# Patient Record
Sex: FEMALE | Race: WHITE | Employment: OTHER | URBAN - METROPOLITAN AREA
[De-identification: names, ages, dates, MRNs, and addresses within clinical notes are randomized per-mention and may not be internally consistent; named-entity substitution may affect disease eponyms.]

---

## 2022-05-04 ENCOUNTER — APPOINTMENT (OUTPATIENT)
Dept: GENERAL RADIOLOGY | Facility: HOSPITAL | Age: 87
End: 2022-05-04
Attending: EMERGENCY MEDICINE
Payer: MEDICARE

## 2022-05-04 ENCOUNTER — HOSPITAL ENCOUNTER (INPATIENT)
Facility: HOSPITAL | Age: 87
LOS: 3 days | Discharge: HOME | End: 2022-05-07
Attending: EMERGENCY MEDICINE | Admitting: INTERNAL MEDICINE
Payer: MEDICARE

## 2022-05-04 ENCOUNTER — APPOINTMENT (OUTPATIENT)
Dept: CT IMAGING | Facility: HOSPITAL | Age: 87
End: 2022-05-04
Attending: EMERGENCY MEDICINE
Payer: MEDICARE

## 2022-05-04 DIAGNOSIS — E87.2 LACTIC ACIDOSIS: ICD-10-CM

## 2022-05-04 DIAGNOSIS — K92.0 HEMATEMESIS WITH NAUSEA: Primary | ICD-10-CM

## 2022-05-04 DIAGNOSIS — N17.9 AKI (ACUTE KIDNEY INJURY) (HCC): ICD-10-CM

## 2022-05-04 DIAGNOSIS — D62 ACUTE BLOOD LOSS ANEMIA: ICD-10-CM

## 2022-05-04 DIAGNOSIS — K57.90 DIVERTICULOSIS: ICD-10-CM

## 2022-05-04 DIAGNOSIS — K63.9 LESION OF COLON: ICD-10-CM

## 2022-05-04 DIAGNOSIS — D72.829 LEUKOCYTOSIS, UNSPECIFIED TYPE: ICD-10-CM

## 2022-05-04 DIAGNOSIS — K63.89 COLONIC MASS: ICD-10-CM

## 2022-05-04 DIAGNOSIS — R00.0 SINUS TACHYCARDIA: ICD-10-CM

## 2022-05-04 LAB
ALBUMIN SERPL-MCNC: 3.5 G/DL (ref 3.4–5)
ALBUMIN/GLOB SERPL: 0.9 {RATIO} (ref 1–2)
ALP LIVER SERPL-CCNC: 50 U/L
ALT SERPL-CCNC: 17 U/L
ANION GAP SERPL CALC-SCNC: 9 MMOL/L (ref 0–18)
ANTIBODY SCREEN: NEGATIVE
AST SERPL-CCNC: 12 U/L (ref 15–37)
ATRIAL RATE: 113 BPM
BASOPHILS # BLD AUTO: 0.06 X10(3) UL (ref 0–0.2)
BASOPHILS NFR BLD AUTO: 0.4 %
BILIRUB SERPL-MCNC: 0.2 MG/DL (ref 0.1–2)
BILIRUB UR QL STRIP.AUTO: NEGATIVE
BUN BLD-MCNC: 50 MG/DL (ref 7–18)
CALCIUM BLD-MCNC: 8.9 MG/DL (ref 8.5–10.1)
CHLORIDE SERPL-SCNC: 105 MMOL/L (ref 98–112)
CLARITY UR REFRACT.AUTO: CLEAR
CO2 SERPL-SCNC: 23 MMOL/L (ref 21–32)
CREAT BLD-MCNC: 1.29 MG/DL
EOSINOPHIL # BLD AUTO: 0.01 X10(3) UL (ref 0–0.7)
EOSINOPHIL NFR BLD AUTO: 0.1 %
ERYTHROCYTE [DISTWIDTH] IN BLOOD BY AUTOMATED COUNT: 15.9 %
GLOBULIN PLAS-MCNC: 3.8 G/DL (ref 2.8–4.4)
GLUCOSE BLD-MCNC: 179 MG/DL (ref 70–99)
GLUCOSE BLD-MCNC: 191 MG/DL (ref 70–99)
GLUCOSE UR STRIP.AUTO-MCNC: NEGATIVE MG/DL
HCT VFR BLD AUTO: 24.4 %
HGB BLD-MCNC: 7.4 G/DL
IMM GRANULOCYTES # BLD AUTO: 0.04 X10(3) UL (ref 0–1)
IMM GRANULOCYTES NFR BLD: 0.3 %
INR BLD: 0.99 (ref 0.8–1.2)
KETONES UR STRIP.AUTO-MCNC: 20 MG/DL
LACTATE SERPL-SCNC: 3.9 MMOL/L (ref 0.4–2)
LACTATE SERPL-SCNC: 5 MMOL/L (ref 0.4–2)
LEUKOCYTE ESTERASE UR QL STRIP.AUTO: NEGATIVE
LYMPHOCYTES # BLD AUTO: 1.64 X10(3) UL (ref 1–4)
LYMPHOCYTES NFR BLD AUTO: 11.5 %
MCH RBC QN AUTO: 25.1 PG (ref 26–34)
MCHC RBC AUTO-ENTMCNC: 30.3 G/DL (ref 31–37)
MCV RBC AUTO: 82.7 FL
MONOCYTES # BLD AUTO: 0.23 X10(3) UL (ref 0.1–1)
MONOCYTES NFR BLD AUTO: 1.6 %
NEUTROPHILS # BLD AUTO: 12.22 X10 (3) UL (ref 1.5–7.7)
NEUTROPHILS # BLD AUTO: 12.22 X10(3) UL (ref 1.5–7.7)
NEUTROPHILS NFR BLD AUTO: 86.1 %
NITRITE UR QL STRIP.AUTO: NEGATIVE
NT-PROBNP SERPL-MCNC: 134 PG/ML (ref ?–450)
OSMOLALITY SERPL CALC.SUM OF ELEC: 302 MOSM/KG (ref 275–295)
P AXIS: 11 DEGREES
P-R INTERVAL: 138 MS
PH UR STRIP.AUTO: 5 [PH] (ref 5–8)
PLATELET # BLD AUTO: 334 10(3)UL (ref 150–450)
POTASSIUM SERPL-SCNC: 4.9 MMOL/L (ref 3.5–5.1)
PROCALCITONIN SERPL-MCNC: <0.05 NG/ML (ref ?–0.16)
PROT SERPL-MCNC: 7.3 G/DL (ref 6.4–8.2)
PROT UR STRIP.AUTO-MCNC: NEGATIVE MG/DL
PROTHROMBIN TIME: 13.1 SECONDS (ref 11.6–14.8)
Q-T INTERVAL: 350 MS
QRS DURATION: 110 MS
QTC CALCULATION (BEZET): 480 MS
R AXIS: -68 DEGREES
RBC # BLD AUTO: 2.95 X10(6)UL
RBC UR QL AUTO: NEGATIVE
RH BLOOD TYPE: POSITIVE
SARS-COV-2 RNA RESP QL NAA+PROBE: NOT DETECTED
SODIUM SERPL-SCNC: 137 MMOL/L (ref 136–145)
SP GR UR STRIP.AUTO: 1.02 (ref 1–1.03)
T AXIS: 40 DEGREES
TROPONIN I HIGH SENSITIVITY: 13 NG/L
TSI SER-ACNC: 1.28 MIU/ML (ref 0.36–3.74)
UROBILINOGEN UR STRIP.AUTO-MCNC: <2 MG/DL
VENTRICULAR RATE: 113 BPM
WBC # BLD AUTO: 14.2 X10(3) UL (ref 4–11)

## 2022-05-04 PROCEDURE — 85025 COMPLETE CBC W/AUTO DIFF WBC: CPT | Performed by: EMERGENCY MEDICINE

## 2022-05-04 PROCEDURE — 96361 HYDRATE IV INFUSION ADD-ON: CPT

## 2022-05-04 PROCEDURE — 85610 PROTHROMBIN TIME: CPT | Performed by: EMERGENCY MEDICINE

## 2022-05-04 PROCEDURE — 82962 GLUCOSE BLOOD TEST: CPT

## 2022-05-04 PROCEDURE — 84484 ASSAY OF TROPONIN QUANT: CPT | Performed by: EMERGENCY MEDICINE

## 2022-05-04 PROCEDURE — 86850 RBC ANTIBODY SCREEN: CPT | Performed by: EMERGENCY MEDICINE

## 2022-05-04 PROCEDURE — 86920 COMPATIBILITY TEST SPIN: CPT

## 2022-05-04 PROCEDURE — 36415 COLL VENOUS BLD VENIPUNCTURE: CPT

## 2022-05-04 PROCEDURE — 80053 COMPREHEN METABOLIC PANEL: CPT | Performed by: EMERGENCY MEDICINE

## 2022-05-04 PROCEDURE — 99285 EMERGENCY DEPT VISIT HI MDM: CPT

## 2022-05-04 PROCEDURE — 36430 TRANSFUSION BLD/BLD COMPNT: CPT

## 2022-05-04 PROCEDURE — 86900 BLOOD TYPING SEROLOGIC ABO: CPT | Performed by: EMERGENCY MEDICINE

## 2022-05-04 PROCEDURE — 86901 BLOOD TYPING SEROLOGIC RH(D): CPT | Performed by: EMERGENCY MEDICINE

## 2022-05-04 PROCEDURE — 96375 TX/PRO/DX INJ NEW DRUG ADDON: CPT

## 2022-05-04 PROCEDURE — 87040 BLOOD CULTURE FOR BACTERIA: CPT | Performed by: EMERGENCY MEDICINE

## 2022-05-04 PROCEDURE — 83605 ASSAY OF LACTIC ACID: CPT | Performed by: EMERGENCY MEDICINE

## 2022-05-04 PROCEDURE — 71045 X-RAY EXAM CHEST 1 VIEW: CPT | Performed by: EMERGENCY MEDICINE

## 2022-05-04 PROCEDURE — 84145 PROCALCITONIN (PCT): CPT | Performed by: EMERGENCY MEDICINE

## 2022-05-04 PROCEDURE — 74178 CT ABD&PLV WO CNTR FLWD CNTR: CPT | Performed by: EMERGENCY MEDICINE

## 2022-05-04 PROCEDURE — 30233N1 TRANSFUSION OF NONAUTOLOGOUS RED BLOOD CELLS INTO PERIPHERAL VEIN, PERCUTANEOUS APPROACH: ICD-10-PCS | Performed by: INTERNAL MEDICINE

## 2022-05-04 PROCEDURE — 83880 ASSAY OF NATRIURETIC PEPTIDE: CPT | Performed by: EMERGENCY MEDICINE

## 2022-05-04 PROCEDURE — 93005 ELECTROCARDIOGRAM TRACING: CPT

## 2022-05-04 PROCEDURE — 84443 ASSAY THYROID STIM HORMONE: CPT | Performed by: EMERGENCY MEDICINE

## 2022-05-04 PROCEDURE — C9113 INJ PANTOPRAZOLE SODIUM, VIA: HCPCS | Performed by: EMERGENCY MEDICINE

## 2022-05-04 PROCEDURE — 81001 URINALYSIS AUTO W/SCOPE: CPT | Performed by: EMERGENCY MEDICINE

## 2022-05-04 PROCEDURE — 96365 THER/PROPH/DIAG IV INF INIT: CPT

## 2022-05-04 RX ORDER — ONDANSETRON 2 MG/ML
4 INJECTION INTRAMUSCULAR; INTRAVENOUS ONCE
Status: COMPLETED | OUTPATIENT
Start: 2022-05-04 | End: 2022-05-04

## 2022-05-04 RX ORDER — IOHEXOL 350 MG/ML
100 INJECTION, SOLUTION INTRAVENOUS
Status: COMPLETED | OUTPATIENT
Start: 2022-05-04 | End: 2022-05-04

## 2022-05-04 NOTE — ED INITIAL ASSESSMENT (HPI)
Patient arrived to the ED from home by [facility] for c/o fatigue that started today. Patient had an episode of vomiting this morning, minimal appetite, and just feeling weak. Patient has not been around anyone sick that she knows of, she is fully vaccinated for COVID. Patient is alert and oriented, hard of hearing, and denies pain.

## 2022-05-05 ENCOUNTER — ANESTHESIA EVENT (OUTPATIENT)
Dept: ENDOSCOPY | Facility: HOSPITAL | Age: 87
End: 2022-05-05
Payer: MEDICARE

## 2022-05-05 LAB
ALBUMIN SERPL-MCNC: 2.7 G/DL (ref 3.4–5)
ALBUMIN/GLOB SERPL: 0.9 {RATIO} (ref 1–2)
ALP LIVER SERPL-CCNC: 39 U/L
ALT SERPL-CCNC: 15 U/L
ANION GAP SERPL CALC-SCNC: 6 MMOL/L (ref 0–18)
AST SERPL-CCNC: 11 U/L (ref 15–37)
BASOPHILS # BLD AUTO: 0.04 X10(3) UL (ref 0–0.2)
BASOPHILS # BLD AUTO: 0.05 X10(3) UL (ref 0–0.2)
BASOPHILS # BLD AUTO: 0.06 X10(3) UL (ref 0–0.2)
BASOPHILS NFR BLD AUTO: 0.5 %
BASOPHILS NFR BLD AUTO: 0.6 %
BASOPHILS NFR BLD AUTO: 0.7 %
BILIRUB SERPL-MCNC: 0.2 MG/DL (ref 0.1–2)
BUN BLD-MCNC: 41 MG/DL (ref 7–18)
CALCIUM BLD-MCNC: 8.4 MG/DL (ref 8.5–10.1)
CHLORIDE SERPL-SCNC: 112 MMOL/L (ref 98–112)
CO2 SERPL-SCNC: 24 MMOL/L (ref 21–32)
CREAT BLD-MCNC: 0.93 MG/DL
EOSINOPHIL # BLD AUTO: 0.08 X10(3) UL (ref 0–0.7)
EOSINOPHIL # BLD AUTO: 0.13 X10(3) UL (ref 0–0.7)
EOSINOPHIL # BLD AUTO: 0.2 X10(3) UL (ref 0–0.7)
EOSINOPHIL NFR BLD AUTO: 0.9 %
EOSINOPHIL NFR BLD AUTO: 1.6 %
EOSINOPHIL NFR BLD AUTO: 2.3 %
ERYTHROCYTE [DISTWIDTH] IN BLOOD BY AUTOMATED COUNT: 15.9 %
ERYTHROCYTE [DISTWIDTH] IN BLOOD BY AUTOMATED COUNT: 16.3 %
ERYTHROCYTE [DISTWIDTH] IN BLOOD BY AUTOMATED COUNT: 16.4 %
GLOBULIN PLAS-MCNC: 3.1 G/DL (ref 2.8–4.4)
GLUCOSE BLD-MCNC: 103 MG/DL (ref 70–99)
GLUCOSE BLD-MCNC: 106 MG/DL (ref 70–99)
GLUCOSE BLD-MCNC: 108 MG/DL (ref 70–99)
GLUCOSE BLD-MCNC: 124 MG/DL (ref 70–99)
GLUCOSE BLD-MCNC: 128 MG/DL (ref 70–99)
HCT VFR BLD AUTO: 27.6 %
HCT VFR BLD AUTO: 28.6 %
HCT VFR BLD AUTO: 29.8 %
HGB BLD-MCNC: 8.4 G/DL
HGB BLD-MCNC: 9 G/DL
HGB BLD-MCNC: 9.3 G/DL
IMM GRANULOCYTES # BLD AUTO: 0.04 X10(3) UL (ref 0–1)
IMM GRANULOCYTES # BLD AUTO: 0.05 X10(3) UL (ref 0–1)
IMM GRANULOCYTES # BLD AUTO: 0.06 X10(3) UL (ref 0–1)
IMM GRANULOCYTES NFR BLD: 0.5 %
IMM GRANULOCYTES NFR BLD: 0.6 %
IMM GRANULOCYTES NFR BLD: 0.7 %
LACTATE SERPL-SCNC: 0.8 MMOL/L (ref 0.4–2)
LACTATE SERPL-SCNC: 2.4 MMOL/L (ref 0.4–2)
LYMPHOCYTES # BLD AUTO: 1.67 X10(3) UL (ref 1–4)
LYMPHOCYTES # BLD AUTO: 1.67 X10(3) UL (ref 1–4)
LYMPHOCYTES # BLD AUTO: 1.75 X10(3) UL (ref 1–4)
LYMPHOCYTES NFR BLD AUTO: 19.7 %
LYMPHOCYTES NFR BLD AUTO: 20.3 %
LYMPHOCYTES NFR BLD AUTO: 20.3 %
MCH RBC QN AUTO: 26.4 PG (ref 26–34)
MCH RBC QN AUTO: 27 PG (ref 26–34)
MCH RBC QN AUTO: 27 PG (ref 26–34)
MCHC RBC AUTO-ENTMCNC: 30.4 G/DL (ref 31–37)
MCHC RBC AUTO-ENTMCNC: 31.2 G/DL (ref 31–37)
MCHC RBC AUTO-ENTMCNC: 31.5 G/DL (ref 31–37)
MCV RBC AUTO: 85.9 FL
MCV RBC AUTO: 86.4 FL
MCV RBC AUTO: 86.8 FL
MONOCYTES # BLD AUTO: 0.64 X10(3) UL (ref 0.1–1)
MONOCYTES # BLD AUTO: 0.66 X10(3) UL (ref 0.1–1)
MONOCYTES # BLD AUTO: 0.76 X10(3) UL (ref 0.1–1)
MONOCYTES NFR BLD AUTO: 7.6 %
MONOCYTES NFR BLD AUTO: 7.8 %
MONOCYTES NFR BLD AUTO: 9 %
NEUTROPHILS # BLD AUTO: 5.66 X10 (3) UL (ref 1.5–7.7)
NEUTROPHILS # BLD AUTO: 5.66 X10(3) UL (ref 1.5–7.7)
NEUTROPHILS # BLD AUTO: 5.88 X10 (3) UL (ref 1.5–7.7)
NEUTROPHILS # BLD AUTO: 5.88 X10(3) UL (ref 1.5–7.7)
NEUTROPHILS # BLD AUTO: 5.95 X10 (3) UL (ref 1.5–7.7)
NEUTROPHILS # BLD AUTO: 5.95 X10(3) UL (ref 1.5–7.7)
NEUTROPHILS NFR BLD AUTO: 68.8 %
NEUTROPHILS NFR BLD AUTO: 68.9 %
NEUTROPHILS NFR BLD AUTO: 69.2 %
OSMOLALITY SERPL CALC.SUM OF ELEC: 305 MOSM/KG (ref 275–295)
PLATELET # BLD AUTO: 198 10(3)UL (ref 150–450)
PLATELET # BLD AUTO: 201 10(3)UL (ref 150–450)
PLATELET # BLD AUTO: 210 10(3)UL (ref 150–450)
POTASSIUM SERPL-SCNC: 4.2 MMOL/L (ref 3.5–5.1)
PROT SERPL-MCNC: 5.8 G/DL (ref 6.4–8.2)
RBC # BLD AUTO: 3.18 X10(6)UL
RBC # BLD AUTO: 3.33 X10(6)UL
RBC # BLD AUTO: 3.45 X10(6)UL
SODIUM SERPL-SCNC: 142 MMOL/L (ref 136–145)
WBC # BLD AUTO: 8.2 X10(3) UL (ref 4–11)
WBC # BLD AUTO: 8.5 X10(3) UL (ref 4–11)
WBC # BLD AUTO: 8.6 X10(3) UL (ref 4–11)

## 2022-05-05 PROCEDURE — 80053 COMPREHEN METABOLIC PANEL: CPT | Performed by: INTERNAL MEDICINE

## 2022-05-05 PROCEDURE — C9113 INJ PANTOPRAZOLE SODIUM, VIA: HCPCS | Performed by: INTERNAL MEDICINE

## 2022-05-05 PROCEDURE — 97161 PT EVAL LOW COMPLEX 20 MIN: CPT

## 2022-05-05 PROCEDURE — 82962 GLUCOSE BLOOD TEST: CPT

## 2022-05-05 PROCEDURE — 36430 TRANSFUSION BLD/BLD COMPNT: CPT

## 2022-05-05 PROCEDURE — 85025 COMPLETE CBC W/AUTO DIFF WBC: CPT | Performed by: INTERNAL MEDICINE

## 2022-05-05 PROCEDURE — C9113 INJ PANTOPRAZOLE SODIUM, VIA: HCPCS | Performed by: NURSE PRACTITIONER

## 2022-05-05 PROCEDURE — 97116 GAIT TRAINING THERAPY: CPT

## 2022-05-05 PROCEDURE — 83605 ASSAY OF LACTIC ACID: CPT | Performed by: INTERNAL MEDICINE

## 2022-05-05 RX ORDER — VIT A/VIT C/VIT E/ZINC/COPPER 2148-113
TABLET ORAL
COMMUNITY

## 2022-05-05 RX ORDER — SODIUM CHLORIDE 9 MG/ML
INJECTION, SOLUTION INTRAVENOUS CONTINUOUS
Status: DISCONTINUED | OUTPATIENT
Start: 2022-05-05 | End: 2022-05-07

## 2022-05-05 RX ORDER — NICOTINE POLACRILEX 4 MG
30 LOZENGE BUCCAL
Status: DISCONTINUED | OUTPATIENT
Start: 2022-05-05 | End: 2022-05-07

## 2022-05-05 RX ORDER — ASPIRIN 81 MG/1
TABLET, CHEWABLE ORAL DAILY
COMMUNITY
End: 2022-05-07

## 2022-05-05 RX ORDER — ACETAMINOPHEN 325 MG/1
650 TABLET ORAL EVERY 6 HOURS PRN
Status: DISCONTINUED | OUTPATIENT
Start: 2022-05-05 | End: 2022-05-07

## 2022-05-05 RX ORDER — ONDANSETRON 2 MG/ML
4 INJECTION INTRAMUSCULAR; INTRAVENOUS EVERY 6 HOURS PRN
Status: DISCONTINUED | OUTPATIENT
Start: 2022-05-05 | End: 2022-05-07

## 2022-05-05 RX ORDER — DEXTROSE MONOHYDRATE 25 G/50ML
50 INJECTION, SOLUTION INTRAVENOUS
Status: DISCONTINUED | OUTPATIENT
Start: 2022-05-05 | End: 2022-05-07

## 2022-05-05 RX ORDER — SIMVASTATIN 40 MG
40 TABLET ORAL NIGHTLY
COMMUNITY

## 2022-05-05 RX ORDER — LOSARTAN POTASSIUM 100 MG/1
100 TABLET ORAL DAILY
COMMUNITY

## 2022-05-05 RX ORDER — NICOTINE POLACRILEX 4 MG
15 LOZENGE BUCCAL
Status: DISCONTINUED | OUTPATIENT
Start: 2022-05-05 | End: 2022-05-07

## 2022-05-05 NOTE — PLAN OF CARE
Received patient via cart from ED. Blood transfusing PRBCs. Patient alert and orientedx4, No complaints of pain at this time. Lung sounds clear to auscultation, no cough, no dyspnea, NPO until seen by GI,abdomen soft and rounded non-tender, NSR. Atul Handsome in place and continent of bowel. IV fluids 0.9NS infusing continuously. PT/OT to evaluate. Patient resting comfortably no further issues. Addendum: 0500- 2 units of blood transfused this shift. Tolerated transfusions well- no adverse reactions, no fevers noted.        Problem: Patient/Family Goals  Goal: Patient/Family Long Term Goal  Description: Patient's Long Term Goal: Home     Interventions:  - Social Work/PT/OT  - See additional Care Plan goals for specific interventions  5/5/2022 0501 by Joselyn Joseph RN  Outcome: Progressing  5/5/2022 0501 by Joselyn Joseph RN  Outcome: Progressing  Goal: Patient/Family Short Term Goal  Description: Patient's Short Term Goal: euvolemic and homeostasis     Interventions:   - stabilize patient hemodynamically- blood transfusions  -iv fluids 0.9 NS   - See additional Care Plan goals for specific interventions  5/5/2022 0501 by Joselyn Joseph RN  Outcome: Progressing  5/5/2022 0501 by Joselyn Joseph RN  Outcome: Progressing

## 2022-05-05 NOTE — ED QUICK NOTES
Patient educated on the benefits and risks of a blood transfusion. Patient and family were able to ask questions which were answered by this RN. Consent form signed and placed in patients chart.

## 2022-05-05 NOTE — PROGRESS NOTES
Spoke with son marilynn with patients permission, regarding home medications and code status paperwork. Per pt she is a full code. son will bring medication and paperwork this am. Updated pt on POC and current status of pt. Verbalized understanding.

## 2022-05-05 NOTE — PROGRESS NOTES
05/05/22 7728   Clinical Encounter Type   Visited With Patient and family together;Health care provider  (Verified patient's condition/situation/ Son was present)   Routine Visit   (Responded to consult)   Jehovah's witness Encounters   Jehovah's witness Needs Prayer  (Prayed and promoted a sense of inner peace/inspiration)   Sacramental Encounters   Communion Given Indicator Yes  (Josh Alberto offered Sikhism Communion)   Patient Spiritual Encounters   Spiritual Assessment Completed Yes   Taxonomy   Intended Effects Build relationship of care and support   Methods Encourage self care;Encourage self reflection;Encourage sharing of feelings   Interventions Acknowledge current situation; Active listening; Share words of hope and inspiration;Provde spiritual/Christian resources; Explain  role;Assist with identifying strengths;Identify supportive relationship(s)   Per POLST consult, introduced POLST form to the patient. Awaiting authorized practitioner to clarify medical intervention (B) part of the form w/ the patient after their goals of care discussion. Notified the RN Ines regarding the same. For further spiritual care, please enter a consult in Epic or page [phone] as needed.

## 2022-05-05 NOTE — PROGRESS NOTES
05/05/22 0600   Provider Notification   Reason for Communication New consult   Provider Name Terri Nichols MD   Method of Communication Page   Response Waiting for response   Notification Time 3445     New consult for GI requested by Dr. Hilda Stout

## 2022-05-05 NOTE — H&P
[facility]    PATIENT'S NAME: KIERRA RIVERA   ATTENDING PHYSICIAN: Iva Matos M.D. PATIENT ACCOUNT#:   [de-identified]    LOCATION:  [address] [place] [address]  MEDICAL RECORD #:   [id number]       YOB: 1928  ADMISSION DATE:       05/04/2022    HISTORY AND PHYSICAL EXAMINATION    CHIEF COMPLAINT:  Patient is coming here for weakness. HISTORY OF PRESENT ILLNESS:  This is a 80-year-old lady who has past history is significant for diabetes, hypertension, and recently noted anemia for the past few months. The patient was encouraged to get workup for a colonoscopy and further diagnostic workup for the causes of anemia. The patient has been refusing this workup because she said that she was 80year-old and does not want to pursue any more doctor visits. She presents with weakness again in the emergency room and was noted to be anemic and also had high lactate levels and was admitted for possible sepsis versus infection as a cause with no clear etiology. She also presented with leukocytosis, anemia, and possible diverticulosis versus a mass that was noted on the CT scan. On presentation to the emergency room, the patient's vitals were blood pressure 175/95, heart rate 121, respiratory rate was 39, pulse ox was 100%. PAST MEDICAL HISTORY:  Significant for diabetes and hypertension and anemia of recent onset. MEDICATIONS:  She is on metformin and lisinopril, dosage unknown at this time. PHYSICAL EXAMINATION:    VITAL SIGNS:  Temperature is 98.5, heart rate is 22, blood pressure is 124/41, pulse ox 95%. HEENT:  Normocephalic, atraumatic. Pupils equal in reaction to light. LUNGS:  Clear to auscultation. No crackles or wheezes. HEART:  S1, S2.  Regular rate and rhythm. ABDOMEN:  Soft, nontender. Good bowel sounds. EXTREMITIES:  Intact. NEUROLOGIC:  Intact. LABORATORY DATA:  On initial presentation, showed glucose of 179 with BUN of 50 and creatinine of 1.29 and eGFR of 36. Calculated osmolality was noted to be slightly high at 302, alkaline phosphatase was 39. Liver enzymes were normal and troponin was slightly elevated at 2.4. The patient's lactic acid was also elevated at 2.9 and then 2.4 subsequently. The patient's other parameters included a white count elevation of 14.2 initially with hemoglobin of 7.24 and hematocrit of 24 which was normocytic and mild shift to the left. Urinalysis was positive for ketones. His rapid SARS COVID test was negative. CT scan of the abdomen was performed along with an x-ray of the chest, also in the emergency room. Chest x-ray showed focal infiltrate versus atelectasis in the retrocardiac left lung base with blunting of the left costophrenic angle. On CT scan of the abdomen and pelvis, a polypoid high-density mass was seen in the distal sigmoid colon, possibly polyp versus other colonic neoplasm and also extensive colonic diverticulosis, hiatal hernia, which was large, colonic wall thickening along the transverse and proximal descending colon. Also possibilities like infectious conditions are entertained. A 4 mm right lower lobe noncalcified pulmonary nodule was also identified. Subsequent to this workup and findings, patient was started on IV fluids. Empiric treatment of IV antibiotics, which was Zosyn was started, given her state of leukocytosis with chest x-ray showing a retrocardiac infiltrate. The patient also was stabilized and Gastroenterology was consulted, given her anemia and colonic sigmoid colonic lesion, for endoscopy. IMPRESSION:    1. This is a 80year-old pleasant lady who presents with weakness, noted to be anemic, and also colonic lesion with left retrocardiac infiltrate noted on the chest x-ray. Given lactate being elevated with leukocytosis and possible infiltrate in the left lung, infectious etiologies with exemption of pneumonia could be entertained.    I will be going ahead and continuing Zosyn for now, IV. Will be continuing oxygen. 2.   Anemia has been of recent onset, pending workup, which the patient refused in the office a couple of times for colonoscopy and endoscopy, upper endoscopy. The patient will be consulted with Gastroenterology for upper and lower endoscopy to confirm the diagnosis for possible lesions contributing to this anemia. 3.   IV Protonix will be started. 4.   The patient will be kept n.p.o.  5.   IV hydration. Gentle hydration with normal saline 200 mL/h.  6.   Serial hemoglobin and hematocrit monitoring along with BMP. 7.   Lactate monitoring tomorrow to see if it is resolving. 8.   Close monitoring with regards to hemodynamic status as well as pulmonary status while continuing IV antibiotics and awaiting workup from Gastroenterology.      Dictated By Mae Herrera M.D.  d: 05/05/2022 09:46:57  t: 05/05/2022 10:03:38  [facility] [id number]  AQ/

## 2022-05-05 NOTE — PLAN OF CARE
Problem: GI Bleed    Data: Patient alert and oriented x4. Patient denies pain. SPO2 remains greater then 92% on room air. Lung sounds diminished. No cough noted. IV fluids and antibiotics continues per MD orders. Patient tolerating clear liquid diet well, denies nausea, no emesis or bowel movement noted this shift. Patient NPO at midnight for EGD/ Colonoscopy tomorrow. Patient turned and repositioned every 2 hours for comfort per self. Patient up with assist to bathroom. Action: Keep patient comfortable and monitor every two hours. Education: Plan of care for the day. Response: Verbalized understanding.       Problem: Diabetes/Glucose Control  Goal: Glucose maintained within prescribed range  Description: INTERVENTIONS:  - Monitor Blood Glucose as ordered  - Assess for signs and symptoms of hyperglycemia and hypoglycemia  - Administer ordered medications to maintain glucose within target range  - Assess barriers to adequate nutritional intake and initiate nutrition consult as needed  - Instruct patient on self management of diabetes  Outcome: Progressing     Problem: Patient/Family Goals  Goal: Patient/Family Long Term Goal  Description: Patient's Long Term Goal: Home     Interventions:  - Social Work/PT/OT  - See additional Care Plan goals for specific interventions  Outcome: Progressing  Goal: Patient/Family Short Term Goal  Description: Patient's Short Term Goal: euvolemic and homeostasis   5/5 AM: no more bleeding    Interventions:   - stabilize patient hemodynamically- blood transfusions  -iv fluids 0.9 NS   - See additional Care Plan goals for specific interventions  Outcome: Progressing

## 2022-05-05 NOTE — ED QUICK NOTES
Orders for admission, patient is aware of plan and ready to go upstairs. Any questions, please call ED RN Lucinda Duran at extension [phone].      Patient Covid vaccination status: Unvaccinated     COVID Test Ordered in ED: Rapid SARS-CoV-2 by PCR    COVID Suspicion at Admission: Low clinical suspicion for COVID    Running Infusions:  Blood 125 mL/hr    Mental Status/LOC at time of transport: AAOx4    Other pertinent information:   CIWA score: N/A   NIH score:  N/A

## 2022-05-05 NOTE — PROGRESS NOTES
05/05/22 0407   Provider Notification   Reason for Communication Other (comment)  (admission)   Provider Name Antonia Bush MD   Method of Communication Call   Response See orders   Notification Time 0407   MD notified of admission. Updated on events. T.O given. See order hx. Updated that pt not familiar with home medications, awaiting confirmation from son for dosing and frequency. MD to be notified in the am. No further orders at this time.

## 2022-05-05 NOTE — CM/SW NOTE
05/05/22 1500   CM/SW Referral Data   Referral Source    Reason for Referral Discharge planning   Informant Patient   Pertinent Medical Hx   Does patient have an established PCP? Yes   Patient Info   Patient's Current Mental Status at Time of Assessment Alert;Oriented   Discharge Needs   Anticipated D/C needs Home health care   Type of Home: House   Home Layout: One level  Stairs to Enter : 0  Lives With: Alone (Son lives nearby)  Drives: Yes  No DME    Spoke with patient re: pt recommendations for home health/int supervision. States son lives nearby, will be available to drive home at time of dc. Explained benefits of home health. Patient is agreeable. CM requested department  [facility]) to initiate [address] referral for [place] [place]. SW/CM will continue to follow.      Malena Clark RN,   [id number]

## 2022-05-06 ENCOUNTER — ANESTHESIA (OUTPATIENT)
Dept: ENDOSCOPY | Facility: HOSPITAL | Age: 87
End: 2022-05-06
Payer: MEDICARE

## 2022-05-06 LAB
ALBUMIN SERPL-MCNC: 2.8 G/DL (ref 3.4–5)
ALBUMIN/GLOB SERPL: 0.9 {RATIO} (ref 1–2)
ALP LIVER SERPL-CCNC: 45 U/L
ALT SERPL-CCNC: 17 U/L
ANION GAP SERPL CALC-SCNC: 7 MMOL/L (ref 0–18)
AST SERPL-CCNC: 7 U/L (ref 15–37)
BASOPHILS # BLD AUTO: 0.03 X10(3) UL (ref 0–0.2)
BASOPHILS # BLD AUTO: 0.03 X10(3) UL (ref 0–0.2)
BASOPHILS NFR BLD AUTO: 0.3 %
BASOPHILS NFR BLD AUTO: 0.4 %
BILIRUB SERPL-MCNC: 0.3 MG/DL (ref 0.1–2)
BLOOD TYPE BARCODE: 7300
BLOOD TYPE BARCODE: 7300
BUN BLD-MCNC: 15 MG/DL (ref 7–18)
CALCIUM BLD-MCNC: 8.1 MG/DL (ref 8.5–10.1)
CHLORIDE SERPL-SCNC: 111 MMOL/L (ref 98–112)
CO2 SERPL-SCNC: 26 MMOL/L (ref 21–32)
CREAT BLD-MCNC: 0.91 MG/DL
EOSINOPHIL # BLD AUTO: 0.16 X10(3) UL (ref 0–0.7)
EOSINOPHIL # BLD AUTO: 0.26 X10(3) UL (ref 0–0.7)
EOSINOPHIL NFR BLD AUTO: 2.3 %
EOSINOPHIL NFR BLD AUTO: 3 %
ERYTHROCYTE [DISTWIDTH] IN BLOOD BY AUTOMATED COUNT: 16.7 %
ERYTHROCYTE [DISTWIDTH] IN BLOOD BY AUTOMATED COUNT: 16.7 %
GLOBULIN PLAS-MCNC: 3.2 G/DL (ref 2.8–4.4)
GLUCOSE BLD-MCNC: 116 MG/DL (ref 70–99)
GLUCOSE BLD-MCNC: 122 MG/DL (ref 70–99)
GLUCOSE BLD-MCNC: 124 MG/DL (ref 70–99)
GLUCOSE BLD-MCNC: 128 MG/DL (ref 70–99)
GLUCOSE BLD-MCNC: 204 MG/DL (ref 70–99)
HCT VFR BLD AUTO: 25 %
HCT VFR BLD AUTO: 26.2 %
HGB BLD-MCNC: 7.8 G/DL
HGB BLD-MCNC: 8.3 G/DL
IMM GRANULOCYTES # BLD AUTO: 0.03 X10(3) UL (ref 0–1)
IMM GRANULOCYTES # BLD AUTO: 0.03 X10(3) UL (ref 0–1)
IMM GRANULOCYTES NFR BLD: 0.3 %
IMM GRANULOCYTES NFR BLD: 0.4 %
LYMPHOCYTES # BLD AUTO: 1.24 X10(3) UL (ref 1–4)
LYMPHOCYTES # BLD AUTO: 1.52 X10(3) UL (ref 1–4)
LYMPHOCYTES NFR BLD AUTO: 17.7 %
LYMPHOCYTES NFR BLD AUTO: 17.9 %
MAGNESIUM SERPL-MCNC: 1.6 MG/DL (ref 1.6–2.6)
MCH RBC QN AUTO: 27 PG (ref 26–34)
MCH RBC QN AUTO: 27 PG (ref 26–34)
MCHC RBC AUTO-ENTMCNC: 31.2 G/DL (ref 31–37)
MCHC RBC AUTO-ENTMCNC: 31.7 G/DL (ref 31–37)
MCV RBC AUTO: 85.3 FL
MCV RBC AUTO: 86.5 FL
MONOCYTES # BLD AUTO: 0.5 X10(3) UL (ref 0.1–1)
MONOCYTES # BLD AUTO: 0.69 X10(3) UL (ref 0.1–1)
MONOCYTES NFR BLD AUTO: 7.2 %
MONOCYTES NFR BLD AUTO: 8 %
NEUTROPHILS # BLD AUTO: 4.97 X10 (3) UL (ref 1.5–7.7)
NEUTROPHILS # BLD AUTO: 4.97 X10(3) UL (ref 1.5–7.7)
NEUTROPHILS # BLD AUTO: 6.07 X10 (3) UL (ref 1.5–7.7)
NEUTROPHILS # BLD AUTO: 6.07 X10(3) UL (ref 1.5–7.7)
NEUTROPHILS NFR BLD AUTO: 70.7 %
NEUTROPHILS NFR BLD AUTO: 71.8 %
OSMOLALITY SERPL CALC.SUM OF ELEC: 300 MOSM/KG (ref 275–295)
PLATELET # BLD AUTO: 183 10(3)UL (ref 150–450)
PLATELET # BLD AUTO: 190 10(3)UL (ref 150–450)
POTASSIUM SERPL-SCNC: 3.9 MMOL/L (ref 3.5–5.1)
PROT SERPL-MCNC: 6 G/DL (ref 6.4–8.2)
RBC # BLD AUTO: 2.89 X10(6)UL
RBC # BLD AUTO: 3.07 X10(6)UL
SODIUM SERPL-SCNC: 144 MMOL/L (ref 136–145)
WBC # BLD AUTO: 6.9 X10(3) UL (ref 4–11)
WBC # BLD AUTO: 8.6 X10(3) UL (ref 4–11)

## 2022-05-06 PROCEDURE — 0DB98ZX EXCISION OF DUODENUM, VIA NATURAL OR ARTIFICIAL OPENING ENDOSCOPIC, DIAGNOSTIC: ICD-10-PCS | Performed by: INTERNAL MEDICINE

## 2022-05-06 PROCEDURE — 83735 ASSAY OF MAGNESIUM: CPT | Performed by: NURSE PRACTITIONER

## 2022-05-06 PROCEDURE — 97530 THERAPEUTIC ACTIVITIES: CPT

## 2022-05-06 PROCEDURE — 80053 COMPREHEN METABOLIC PANEL: CPT | Performed by: INTERNAL MEDICINE

## 2022-05-06 PROCEDURE — 88305 TISSUE EXAM BY PATHOLOGIST: CPT | Performed by: INTERNAL MEDICINE

## 2022-05-06 PROCEDURE — 0DJD8ZZ INSPECTION OF LOWER INTESTINAL TRACT, VIA NATURAL OR ARTIFICIAL OPENING ENDOSCOPIC: ICD-10-PCS | Performed by: INTERNAL MEDICINE

## 2022-05-06 PROCEDURE — 0DB78ZX EXCISION OF STOMACH, PYLORUS, VIA NATURAL OR ARTIFICIAL OPENING ENDOSCOPIC, DIAGNOSTIC: ICD-10-PCS | Performed by: INTERNAL MEDICINE

## 2022-05-06 PROCEDURE — 85025 COMPLETE CBC W/AUTO DIFF WBC: CPT | Performed by: INTERNAL MEDICINE

## 2022-05-06 PROCEDURE — C9113 INJ PANTOPRAZOLE SODIUM, VIA: HCPCS | Performed by: NURSE PRACTITIONER

## 2022-05-06 PROCEDURE — 97116 GAIT TRAINING THERAPY: CPT

## 2022-05-06 PROCEDURE — 82962 GLUCOSE BLOOD TEST: CPT

## 2022-05-06 RX ORDER — LIDOCAINE HYDROCHLORIDE 10 MG/ML
INJECTION, SOLUTION EPIDURAL; INFILTRATION; INTRACAUDAL; PERINEURAL AS NEEDED
Status: DISCONTINUED | OUTPATIENT
Start: 2022-05-06 | End: 2022-05-06 | Stop reason: SURG

## 2022-05-06 RX ORDER — NALOXONE HYDROCHLORIDE 0.4 MG/ML
80 INJECTION, SOLUTION INTRAMUSCULAR; INTRAVENOUS; SUBCUTANEOUS AS NEEDED
Status: DISCONTINUED | OUTPATIENT
Start: 2022-05-06 | End: 2022-05-06 | Stop reason: HOSPADM

## 2022-05-06 RX ORDER — MAGNESIUM OXIDE 400 MG/1
400 TABLET ORAL ONCE
Status: COMPLETED | OUTPATIENT
Start: 2022-05-06 | End: 2022-05-06

## 2022-05-06 RX ORDER — HYDROMORPHONE HYDROCHLORIDE 1 MG/ML
0.6 INJECTION, SOLUTION INTRAMUSCULAR; INTRAVENOUS; SUBCUTANEOUS EVERY 5 MIN PRN
Status: DISCONTINUED | OUTPATIENT
Start: 2022-05-06 | End: 2022-05-06 | Stop reason: HOSPADM

## 2022-05-06 RX ORDER — SODIUM CHLORIDE, SODIUM LACTATE, POTASSIUM CHLORIDE, CALCIUM CHLORIDE 600; 310; 30; 20 MG/100ML; MG/100ML; MG/100ML; MG/100ML
INJECTION, SOLUTION INTRAVENOUS CONTINUOUS
Status: DISCONTINUED | OUTPATIENT
Start: 2022-05-06 | End: 2022-05-07

## 2022-05-06 RX ORDER — PANTOPRAZOLE SODIUM 40 MG/1
TABLET, DELAYED RELEASE ORAL
Qty: 90 TABLET | Refills: 0 | Status: SHIPPED | OUTPATIENT
Start: 2022-05-06

## 2022-05-06 RX ORDER — HYDROMORPHONE HYDROCHLORIDE 1 MG/ML
0.2 INJECTION, SOLUTION INTRAMUSCULAR; INTRAVENOUS; SUBCUTANEOUS EVERY 5 MIN PRN
Status: DISCONTINUED | OUTPATIENT
Start: 2022-05-06 | End: 2022-05-06 | Stop reason: HOSPADM

## 2022-05-06 RX ORDER — HYDROMORPHONE HYDROCHLORIDE 1 MG/ML
0.4 INJECTION, SOLUTION INTRAMUSCULAR; INTRAVENOUS; SUBCUTANEOUS EVERY 5 MIN PRN
Status: DISCONTINUED | OUTPATIENT
Start: 2022-05-06 | End: 2022-05-06 | Stop reason: HOSPADM

## 2022-05-06 RX ADMIN — SODIUM CHLORIDE: 9 INJECTION, SOLUTION INTRAVENOUS at 13:05:00

## 2022-05-06 RX ADMIN — LIDOCAINE HYDROCHLORIDE 25 MG: 10 INJECTION, SOLUTION EPIDURAL; INFILTRATION; INTRACAUDAL; PERINEURAL at 13:05:00

## 2022-05-06 RX ADMIN — SODIUM CHLORIDE: 9 INJECTION, SOLUTION INTRAVENOUS at 13:44:00

## 2022-05-06 NOTE — CM/SW NOTE
Department  notified of request for [facility] referrals started. Assigned CM/SW to follow up with pt/family on further discharge planning.      Alis Deng   May 06, 2022   08:26

## 2022-05-06 NOTE — PLAN OF CARE
Problem: Diabetes/Glucose Control  Goal: Glucose maintained within prescribed range  Description: INTERVENTIONS:  - Monitor Blood Glucose as ordered  - Assess for signs and symptoms of hyperglycemia and hypoglycemia  - Administer ordered medications to maintain glucose within target range  - Assess barriers to adequate nutritional intake and initiate nutrition consult as needed  - Instruct patient on self management of diabetes  Outcome: Progressing     Problem: Patient/Family Goals  Goal: Patient/Family Long Term Goal  Description: Patient's Long Term Goal: Home     Interventions:  - Social Work/PT/OT  - See additional Care Plan goals for specific interventions  Outcome: Progressing  Goal: Patient/Family Short Term Goal  Description: Patient's Short Term Goal: euvolemic and homeostasis   5/5 AM: no more bleeding  5/5 NOC: bowel prep, NPO at midnight  5/6 AM: Have procedures    Interventions:   - stabilize patient hemodynamically- blood transfusions  -iv fluids 0.9 NS   - See additional Care Plan goals for specific interventions  Outcome: Progressing     Problem: HEMATOLOGIC - ADULT  Goal: Maintains hematologic stability  Description: INTERVENTIONS  - Assess for signs and symptoms of bleeding or hemorrhage  - Monitor labs and vital signs for trends  - Administer supportive blood products/factors, fluids and medications as ordered and appropriate  - Administer supportive blood products/factors as ordered and appropriate  Outcome: Progressing

## 2022-05-06 NOTE — PHYSICAL THERAPY NOTE
PHYSICAL THERAPY TREATMENT NOTE - INPATIENT    Room Number: 901/028-M     Session: 1    Number of Visits to Meet Established Goals: 3    Presenting Problem: hematemesis with nausea  Co-Morbidities : hx colon  mass, DM, HTN     Presenting Problem: hematemesis with nausea  Co-Morbidities : hx colon  mass  Reason for Therapy: Mobility Dysfunction and Discharge Planning     History related to current admission: Patient is a 80year old female admitted on 5/4/2022 from home weakness, hematemesis with nausea. Pt diagnosed with anemia, s/p blood transfusion 5/4/22. Hgb 9.3. Work up in progress. ASSESSMENT     Patient demonstrates good progress with therapy. Requires max encouragement to participate. Pt is close to achieving all short-term goals. Will anticipate 1 additional session to determine if pt is maintaining functional mobility s/p planned colonoscopy. DISCHARGE RECOMMENDATIONS  PT Discharge Recommendations: Home with home health PT; Intermittent Supervision     PLAN  PT Treatment Plan: Bed mobility; Endurance;Gait training;Energy conservation;Strengthening;Transfer training;Balance training  Rehab Potential : Good  Frequency (Obs): 3-5x/week    CURRENT GOALS     Goal #1 Patient is able to demonstrate supine - sit EOB @ level: independent   Completed 5/6/22 PS/CY    Goal #2 Patient is able to demonstrate transfers EOB to/from Chair/Wheelchair at assistance level: modified independent   Completed 5/6/22 PS/CY   Goal #3 Patient is able to ambulate 300 feet with assist device: none at assistance level: supervision   Completed 5/6/22 PS/CY   Goal #4  Updated: Patient is able to negotiate a flight of stairs with 1 railing at assistance level: supervision   Goal #5     Goal #6     Goal Comments: Goals established on 5/5/2022. Updated 5/6/22 PS/CY. 5/6/2022 all goals established at eval achieved, goals updated      SUBJECTIVE  \"I'm okay with walking. \"    OBJECTIVE  Precautions: Hard of hearing    WEIGHT BEARING RESTRICTION  Weight Bearing Restriction: None                PAIN ASSESSMENT   Ratin          BALANCE                                                                                                                       Static Sitting: Fair +  Dynamic Sitting: Fair           Static Standing: Fair +  Dynamic Standing: Fair    ACTIVITY TOLERANCE                         O2 WALK         AM-PAC '6-Clicks' INPATIENT SHORT FORM - BASIC MOBILITY  How much difficulty does the patient currently have. .. Patient Difficulty: Turning over in bed (including adjusting bedclothes, sheets and blankets)?: None   Patient Difficulty: Sitting down on and standing up from a chair with arms (e.g., wheelchair, bedside commode, etc.): None   Patient Difficulty: Moving from lying on back to sitting on the side of the bed?: None   How much help from another person does the patient currently need. .. Help from Another: Moving to and from a bed to a chair (including a wheelchair)?: None   Help from Another: Need to walk in hospital room?: None   Help from Another: Climbing 3-5 steps with a railing?: A Little       AM-PAC Score:  Raw Score: 23   Approx Degree of Impairment: 11.2%   Standardized Score (AM-PAC Scale): 56.93   CMS Modifier (G-Code): CI    FUNCTIONAL ABILITY STATUS  Gait Assessment   Functional Mobility/Gait Assessment  Gait Assistance: Independent  Distance (ft): 300  Assistive Device: None  Pattern:  (Decreased step height)    Skilled Therapy Provided    Bed Mobility:  Rolling right: IND   Rolling left: IND    Supine<>Sit: IND   Sit<>Supine: IND    Transfer Mobility:  Sit<>Stand: Mod I (uses UE support)  Stand<>Sit: Mod I  Gait: IND (performed gaze aversion testing, no LOB noted)   Education: Role of PT, goals, activity recs. Pt in agreement. Therapist's Comments: VSS throughout session. Performed standing reaching to assess balance.       THERAPEUTIC EXERCISES  Lower Extremity N/A     Upper Extremity N/A     Position N/A     Repetitions   N/A   Sets   N/A     Patient End of Session: In bed;Needs met;Call light within reach;RN aware of session/findings

## 2022-05-06 NOTE — OPERATIVE REPORT
Operative Report-Esophagogastroduodenoscopy with cold biopsies  Samantha Jiménez 7/16/1928   [facility] [id number] MRN [id number]   Admission Date 5/4/2022 Operation Date 5/6/2022   Attending Physician Carlos Starr MD Operating Physician Ray Byrne DO     PREOPERATIVE DIAGNOSIS/INDICATION: GEORGES  POSTOPERTATIVE DIAGNOSIS: Hiatal hernia, gastric ulceration, Guillermo's erosions, duodenal bulb nodularity  PROCEDURE PERFORMED: EGD  INFORMED CONSENT: Once a brief history and physical examination was performed, the risks, benefits and alternatives to the procedure were discussed with the patient and/or family and informed consent was obtained. The risks of sedation, perforation, missed lesions and need for surgery were all discussed. Patient expressed understanding of the risks and agreed to proceed. PROCEDURE DESCRIPTION:  The patient was then brought to the endoscopy suite where her pulse, pulse oximetry and blood pressure were monitored. She was placed in the left lateral decubitus position and deep sedation was administered. Once adequate sedation was achieved, a bite block was placed and a lubricated tip of an Olympus video upper endoscope was inserted through the oropharynx and gently manipulated through the esophagus into the stomach and the distal duodenum. Upon withdrawal of the endoscope, careful visualization of the mucosa was performed. FINDINGS:  - ESOPHAGUS: See below.   - EGJ: The GEJ was located at 36 cm. The SCJ was located at 30 cm and was regular.   - STOMACH: There was a 6 cm hiatal hernia. There was erythema/small erosions in the hernia sac consistent with Guillermo's erosions. There was a superficial ulceration in the gastric body which was not bleeding.   - DUODENUM: The duodenal bulb was quite nodular. The second portion of the duodenum was normal.   THERAPEUTICS: Biopsies were performed from the duodenal bulb, second portion of the duodenum, antrum/body with a cold biopsy forceps. RECOMMENDATIONS:   - Post EGD precautions, watch for bleeding, infection, perforation, adverse drug reactions   - Follow biopsies.  - PPI daily   - Avoid NSAIDs  CC Report:     Damien Fuentes DO  5/6/2022  1:15 PM

## 2022-05-06 NOTE — PROGRESS NOTES
Received pt Aox4. [place] with bilateral hearing aids.  on RA. NSR on tele. EGD/Colonoscopy performed today - see results. Voids. IVF at 100mL/hr. IV Zosyn. IV PPI. Low fiber/soft diet with QID accuchecks. Up standby. No further needs at this time.

## 2022-05-06 NOTE — CM/SW NOTE
05/06/22 1040   Choice of Post-Acute Provider   Informed patient of right to choose their preferred provider Yes   List of appropriate post-acute services provided to patient/family with quality data Yes   Patient/family choice [facility]   Information given to Patient     Only accepting agency is [facility], reserved in aidin. / to remain available for any further discharge planning needs. 1210: call from [facility], they are out of network, can not accept patient. Also received call from [place] rep, in network agencies added to referral, await responses.      Fay Ybarra, RN,   [id number]

## 2022-05-06 NOTE — PROGRESS NOTES
Occupational Therapy    Orders received and chart review completed. Attempted to see Pt for skilled OT eval. However, Pt off unit for colonoscopy. Will f/u at next scheduled visit. RN aware.

## 2022-05-06 NOTE — OPERATIVE REPORT
Operative Report-Colonoscopy    Jewelene Parcel 7/16/1928   [facility] [id number] MRN [id number]   Admission Date 5/4/2022 Operation Date 5/6/2022   Attending Physician Viet Garcia MD Operating Physician Prasad Alba DO     PREOPERATIVE DIAGNOSIS/INDICATION: Abnormal imaging  POSTOPERTATIVE DIAGNOSIS: Diverticulosis, hemorrhoids, polyps  PROCEDURE PERFORMED: COLONOSCOPY  INFORMED CONSENT:  Once a brief history and physical examination was performed, the risks, benefits and alternatives to the procedure were discussed with the patient and/or family and informed consent was obtained. The risks of sedation, perforation, missed lesions and need for surgery were all discussed. Patient expressed understanding of the risks and agreed to proceed. PROCEDURE DESCRIPTION:The patient was then brought to the endoscopy suite where her pulse, pulse oximetry and blood pressure were monitored. She was placed in the left lateral decubitus position and deep sedation was administered. Once adequate sedation was achieved, a rectal exam was performed which was normal. A lubricated tip of an Olympus video colonoscope was then inserted through the rectum and gently manipulated under direct visualization to the cecal pole and the terminal ileum. The quality of the preparation was good. Upon withdrawal of the colonoscope, careful visualization of the mucosa was performed. Allred Prep Score: Right Colon 3 Transverse colon 3 Left colon 3  FINDINGS/THERAPEUTICS:  - TERMINAL ILEUM: Normal.   - COLON: There were a few 4 mm, sessile polyps in the colon which were not removed given risks outweigh benefits of polypectomy. There was diverticulosis in the left colon. There was no mass or colitis. - RECTUM: Grade II Internal hemorrhoids.   RECOMMENDATIONS:   - Post Colonoscopy precautions, watch for bleeding, infection, perforation, adverse drug reactions     CC Report:   Prasad Alba DO  5/6/2022  1:44 PM

## 2022-05-06 NOTE — PLAN OF CARE
Received pt A&Ox4. [place].  on RA. NSR on tele. No anticoagulant ordered d/t possible GI bleed. Clear liquid diet until midnight, NPO afterwards. Completed bowel prep. EGD/Colonoscopy planned for 5/6. Continuous IVF. IV abx for diverticulitis. Voids, up with SBA. Denies pain. Updated on plan of care, will continue to monitor.      Problem: Diabetes/Glucose Control  Goal: Glucose maintained within prescribed range  Description: INTERVENTIONS:  - Monitor Blood Glucose as ordered  - Assess for signs and symptoms of hyperglycemia and hypoglycemia  - Administer ordered medications to maintain glucose within target range  - Assess barriers to adequate nutritional intake and initiate nutrition consult as needed  - Instruct patient on self management of diabetes  5/6/2022 0243 by Mariann Duron RN  Outcome: Progressing  5/6/2022 0241 by Mariann Duron RN  Outcome: Progressing     Problem: Patient/Family Goals  Goal: Patient/Family Long Term Goal  Description: Patient's Long Term Goal: Home     Interventions:  - Social Work/PT/OT  - See additional Care Plan goals for specific interventions  5/6/2022 0243 by Mariann Duron RN  Outcome: Progressing  5/6/2022 0241 by Mariann Duron RN  Outcome: Progressing  Goal: Patient/Family Short Term Goal  Description: Patient's Short Term Goal: euvolemic and homeostasis   5/5 AM: no more bleeding  5/5 NOC: bowel prep, NPO at midnight  Interventions:   - stabilize patient hemodynamically- blood transfusions  -iv fluids 0.9 NS   - See additional Care Plan goals for specific interventions  5/6/2022 0243 by Mariann Duron RN  Outcome: Progressing  5/6/2022 0241 by Mariann Duron RN  Outcome: Progressing     Problem: HEMATOLOGIC - ADULT  Goal: Maintains hematologic stability  Description: INTERVENTIONS  - Assess for signs and symptoms of bleeding or hemorrhage  - Monitor labs and vital signs for trends  - Administer supportive blood products/factors, fluids and medications as ordered and appropriate  - Administer supportive blood products/factors as ordered and appropriate  Outcome: Progressing

## 2022-05-06 NOTE — PROGRESS NOTES
Pt's symptoms likely due to large hiatal hernia, Guillermo's erosions and gastric ulceration. PPI daily once home and avoid NSAIDs.      We will sign off at this time but she should follow up in clinic    Paddy Clark,   1:50 PM  [facility]  [phone]

## 2022-05-07 VITALS
DIASTOLIC BLOOD PRESSURE: 55 MMHG | RESPIRATION RATE: 18 BRPM | HEIGHT: 62 IN | SYSTOLIC BLOOD PRESSURE: 169 MMHG | BODY MASS INDEX: 25.68 KG/M2 | TEMPERATURE: 98 F | OXYGEN SATURATION: 95 % | HEART RATE: 86 BPM | WEIGHT: 139.56 LBS

## 2022-05-07 LAB
ALBUMIN SERPL-MCNC: 2.7 G/DL (ref 3.4–5)
ALBUMIN/GLOB SERPL: 0.8 {RATIO} (ref 1–2)
ALP LIVER SERPL-CCNC: 46 U/L
ALT SERPL-CCNC: 27 U/L
ANION GAP SERPL CALC-SCNC: 5 MMOL/L (ref 0–18)
AST SERPL-CCNC: 17 U/L (ref 15–37)
BASOPHILS # BLD AUTO: 0.02 X10(3) UL (ref 0–0.2)
BASOPHILS NFR BLD AUTO: 0.3 %
BILIRUB SERPL-MCNC: 0.3 MG/DL (ref 0.1–2)
BUN BLD-MCNC: 15 MG/DL (ref 7–18)
CALCIUM BLD-MCNC: 8 MG/DL (ref 8.5–10.1)
CHLORIDE SERPL-SCNC: 109 MMOL/L (ref 98–112)
CO2 SERPL-SCNC: 28 MMOL/L (ref 21–32)
CREAT BLD-MCNC: 1.1 MG/DL
EOSINOPHIL # BLD AUTO: 0.3 X10(3) UL (ref 0–0.7)
EOSINOPHIL NFR BLD AUTO: 4.3 %
ERYTHROCYTE [DISTWIDTH] IN BLOOD BY AUTOMATED COUNT: 16.7 %
GLOBULIN PLAS-MCNC: 3.2 G/DL (ref 2.8–4.4)
GLUCOSE BLD-MCNC: 127 MG/DL (ref 70–99)
GLUCOSE BLD-MCNC: 129 MG/DL (ref 70–99)
GLUCOSE BLD-MCNC: 129 MG/DL (ref 70–99)
HCT VFR BLD AUTO: 25.1 %
HGB BLD-MCNC: 7.8 G/DL
IMM GRANULOCYTES # BLD AUTO: 0.02 X10(3) UL (ref 0–1)
IMM GRANULOCYTES NFR BLD: 0.3 %
LACTATE SERPL-SCNC: 0.7 MMOL/L (ref 0.4–2)
LYMPHOCYTES # BLD AUTO: 1.31 X10(3) UL (ref 1–4)
LYMPHOCYTES NFR BLD AUTO: 18.7 %
MAGNESIUM SERPL-MCNC: 1.7 MG/DL (ref 1.6–2.6)
MCH RBC QN AUTO: 26.8 PG (ref 26–34)
MCHC RBC AUTO-ENTMCNC: 31.1 G/DL (ref 31–37)
MCV RBC AUTO: 86.3 FL
MONOCYTES # BLD AUTO: 0.58 X10(3) UL (ref 0.1–1)
MONOCYTES NFR BLD AUTO: 8.3 %
NEUTROPHILS # BLD AUTO: 4.79 X10 (3) UL (ref 1.5–7.7)
NEUTROPHILS # BLD AUTO: 4.79 X10(3) UL (ref 1.5–7.7)
NEUTROPHILS NFR BLD AUTO: 68.1 %
OSMOLALITY SERPL CALC.SUM OF ELEC: 297 MOSM/KG (ref 275–295)
PLATELET # BLD AUTO: 186 10(3)UL (ref 150–450)
POTASSIUM SERPL-SCNC: 3.8 MMOL/L (ref 3.5–5.1)
PROT SERPL-MCNC: 5.9 G/DL (ref 6.4–8.2)
RBC # BLD AUTO: 2.91 X10(6)UL
SODIUM SERPL-SCNC: 142 MMOL/L (ref 136–145)
WBC # BLD AUTO: 7 X10(3) UL (ref 4–11)

## 2022-05-07 PROCEDURE — 80053 COMPREHEN METABOLIC PANEL: CPT | Performed by: INTERNAL MEDICINE

## 2022-05-07 PROCEDURE — 97530 THERAPEUTIC ACTIVITIES: CPT

## 2022-05-07 PROCEDURE — 85025 COMPLETE CBC W/AUTO DIFF WBC: CPT | Performed by: INTERNAL MEDICINE

## 2022-05-07 PROCEDURE — 83735 ASSAY OF MAGNESIUM: CPT | Performed by: INTERNAL MEDICINE

## 2022-05-07 PROCEDURE — 97165 OT EVAL LOW COMPLEX 30 MIN: CPT

## 2022-05-07 PROCEDURE — 82962 GLUCOSE BLOOD TEST: CPT

## 2022-05-07 PROCEDURE — 83605 ASSAY OF LACTIC ACID: CPT | Performed by: INTERNAL MEDICINE

## 2022-05-07 RX ORDER — PANTOPRAZOLE SODIUM 40 MG/1
40 TABLET, DELAYED RELEASE ORAL
Qty: 30 TABLET | Refills: 0 | Status: SHIPPED | OUTPATIENT
Start: 2022-05-08 | End: 2022-06-07

## 2022-05-07 RX ORDER — LEVOFLOXACIN 750 MG/1
750 TABLET ORAL
Status: DISCONTINUED | OUTPATIENT
Start: 2022-05-07 | End: 2022-05-07

## 2022-05-07 RX ORDER — LEVOFLOXACIN 750 MG/1
750 TABLET ORAL
Qty: 5 TABLET | Refills: 0 | Status: SHIPPED | OUTPATIENT
Start: 2022-05-09

## 2022-05-07 RX ORDER — LEVOFLOXACIN 750 MG/1
750 TABLET ORAL
Status: DISCONTINUED | OUTPATIENT
Start: 2022-05-09 | End: 2022-05-07

## 2022-05-07 RX ORDER — PANTOPRAZOLE SODIUM 40 MG/1
40 TABLET, DELAYED RELEASE ORAL
Status: DISCONTINUED | OUTPATIENT
Start: 2022-05-07 | End: 2022-05-07

## 2022-05-07 RX ORDER — MAGNESIUM OXIDE 400 MG/1
400 TABLET ORAL ONCE
Status: COMPLETED | OUTPATIENT
Start: 2022-05-07 | End: 2022-05-07

## 2022-05-07 NOTE — PLAN OF CARE
Multidisciplinary Discharge Rounds held 5/7/2022. Treatment team members present today include , , Charge Nurse, Nurse, RT, PT and Pharmacy caring for [facility]. Other care providers present:    Mobility Goal:    Readmission Risk:     [] Low     [x] Medium     [] High    Active issue(s) requiring resolution prior to discharge: ANEMIA    Anticipated discharge date: TBD    Current discharge plan: HOME    F/U appointment scheduled within 7 days. .. [] [facility] ([facility])     [] Pulmonologist     [x] Primary Care Physician     [] Other Specialty    Watched the home discharge recovery videos related to diagnosis. .. [] Pneumonia     [] COPD    [] Home Health set up. [x] Care partner identified and updated with the plan of care.     Problem: Diabetes/Glucose Control  Goal: Glucose maintained within prescribed range  Description: INTERVENTIONS:  - Monitor Blood Glucose as ordered  - Assess for signs and symptoms of hyperglycemia and hypoglycemia  - Administer ordered medications to maintain glucose within target range  - Assess barriers to adequate nutritional intake and initiate nutrition consult as needed  - Instruct patient on self management of diabetes  Outcome: Progressing     Problem: Patient/Family Goals  Goal: Patient/Family Long Term Goal  Description: Patient's Long Term Goal: Home     Interventions:  - Social Work/PT/OT  - See additional Care Plan goals for specific interventions  Outcome: Progressing  Goal: Patient/Family Short Term Goal  Description: Patient's Short Term Goal: euvolemic and homeostasis   5/5 AM: no more bleeding  5/5 NOC: bowel prep, NPO at midnight  05/06/2022 - FOR HGB  TO REMAIN STABLE  5/7/22 NO SIGNS OF BLEEDING  Interventions:   - stabilize patient hemodynamically- blood transfusions  -iv fluids 0.9 NS   - MONITOR FOR BLEEDING  -MONITOR BLOOD WORK  - See additional Care Plan goals for specific interventions  Outcome: Progressing Problem: HEMATOLOGIC - ADULT  Goal: Maintains hematologic stability  Description: INTERVENTIONS  - Assess for signs and symptoms of bleeding or hemorrhage  - Monitor labs and vital signs for trends  - Administer supportive blood products/factors, fluids and medications as ordered and appropriate  - Administer supportive blood products/factors as ordered and appropriate  Outcome: Progressing

## 2022-05-07 NOTE — PROGRESS NOTES
PATIENT DESATURATED TO THE LOW 80's ON ROOM AIR WHILE ASLEEP.  2L OF  SUPPLEMENTAL O2/NC WAS PROVIDED TO KEEP HER SATS SATS ABOVE 90%.

## 2022-05-07 NOTE — PROGRESS NOTES
05/07/22 1600   Clinical Encounter Type   Visited With Patient and family together   Continue Visiting No   Patient's Supportive Strategies/Resources Patient and family stated the POLST was no longer needed as patient was about to be discharged.  remains available at pager #[phone] as needed/requested.

## 2022-05-07 NOTE — PROGRESS NOTES
NURSING DISCHARGE NOTE    Discharged Home via Wheelchair. Accompanied by Family member  Belongings Taken by patient/family. DISCHARGED TO HOME WITH ORDERS FROM DR. YOU. HOME MEDICATIONS AND INSTRUCTIONS DISCUSSED WITH PT AND  SON GWENDOLYN.

## 2022-05-07 NOTE — PROGRESS NOTES
[facility] Pharmacy Note:  Renal Adjustment for levofloxacin (Lamonte Kelly)    Evangelista Nguyen is a 80year old patient who has been prescribed levofloxacin (LEVAQUIN) 750 mg every 24 hrs. The estimated creatinine clearance is 25.3 mL/min (A) (based on SCr of 1.1 mg/dL (H)). The dose has been adjusted to levofloxacin (LEVAQUIN) 750 mg every 48 hrs per hospital renal dose adjustment protocol for treatment of pneumonia. Pharmacy will follow and adjust dose as warranted for additional renal function changes.     Thank you,    Fredi Butterfield, [facility]  5/7/2022  8:59 AM

## 2022-05-07 NOTE — PLAN OF CARE
Problem: Diabetes/Glucose Control  Goal: Glucose maintained within prescribed range  Description: INTERVENTIONS:  - Monitor Blood Glucose as ordered  - Assess for signs and symptoms of hyperglycemia and hypoglycemia  - Administer ordered medications to maintain glucose within target range  - Assess barriers to adequate nutritional intake and initiate nutrition consult as needed  - Instruct patient on self management of diabetes  Outcome: Progressing     Problem: Patient/Family Goals  Goal: Patient/Family Long Term Goal  Description: Patient's Long Term Goal: Home     Interventions:  - Social Work/PT/OT  - See additional Care Plan goals for specific interventions  Outcome: Progressing  Goal: Patient/Family Short Term Goal  Description: Patient's Short Term Goal: euvolemic and homeostasis   5/5 AM: no more bleeding  5/5 NOC: bowel prep, NPO at midnight  05/06/2022 - FOR HGB  TO REMAIN STABLE  Interventions:   - stabilize patient hemodynamically- blood transfusions  -iv fluids 0.9 NS   - MONITOR FOR BLEEDING  -MONITOR BLOOD WORK  - See additional Care Plan goals for specific interventions  Outcome: Progressing     Problem: HEMATOLOGIC - ADULT  Goal: Maintains hematologic stability  Description: INTERVENTIONS  - Assess for signs and symptoms of bleeding or hemorrhage  - Monitor labs and vital signs for trends  - Administer supportive blood products/factors, fluids and medications as ordered and appropriate  - Administer supportive blood products/factors as ordered and appropriate  Outcome: Progressing

## 2022-05-07 NOTE — PROGRESS NOTES
PATIENT A&OX4. HARD OF HEARING. DENIES DISCOMFORT. SATURATING ABOVE 90% ON ROOM AIR WHILE AT REST. NSR ON TELEMETRY. SHE DENIES NAUSEA, NO VOMITING, NO STOOL. UP TO THE BATHROOM WITH STANDBY ASSIST TO VOID. IV PROTONIX AND IV ZOSYN as ORDERED. BEDTIME ACCUCHECK =   128. LATEST HGB 7.8. NO COMPLAINTS OF PAIN, AFEBRILE.

## 2022-05-09 NOTE — PAYOR COMM NOTE
Discharge Notification    Patient Name: Nikhil Amaro: YASSINESUNNI BULLARD PPO  Subscriber #: [id number]  Authorization Number:  [id number]  Admit Date/Time: 5/4/2022 3:22 PM  Discharge Date/Time: 5/7/2022 4:04 PM

## 2022-05-11 ENCOUNTER — PATIENT OUTREACH (OUTPATIENT)
Dept: CASE MANAGEMENT | Age: 87
End: 2022-05-11

## 2022-05-11 NOTE — PROGRESS NOTES
Received call from patient requesting assistance scheduling apt     [address] [facility] [phone]  Apt made Wed. June 8th @2pm     Patient notified

## 2022-06-23 NOTE — TELEPHONE ENCOUNTER
I received a call from Nga Martel (D-I-L) of [facility]. She is supposed to have an iron infusion and wanted to make an appointment. I referred her back to the cancer center. I also instructed her that if the cancer center doesn't have an order for the iron infusion to contact [facility] as to where she is supposed to go for the iron infusion. Nga Martel verbalized understanding.

## 2022-06-30 NOTE — PROGRESS NOTES
Education Record    Learner:  Patient and Family Member    Disease / Diagnosis: Venofer    Barriers / Limitations:  None   Comments:    Method:  Brief focused and Reinforcement   Comments:    General Topics:  Diet, Medication, Side effects and symptom management and Plan of care reviewed   Comments:    Outcome:  Shows understanding   Comments: Venofer infused without any complications. Observed for half hour after infusion. Vital signs taken at the end of the 30-minute observation. Patient denied any complaints/issues. Discharged in good condition. Advised to call for any questions/concerns/issues.

## 2022-07-14 NOTE — PROGRESS NOTES
Education Record    Learner:  Patient and Family Member    Disease / Diagnosis: iron deficiency anemia    Barriers / Limitations:   Other:  hard of hearing   Comments:    Method:  Brief focused and Reinforcement   Comments:    General Topics:  Medication, Plan of care reviewed and Fall risk and prevention   Comments:    Outcome:  Shows understanding   Comments:

## 2022-07-28 NOTE — PROGRESS NOTES
Education Record    Learner:  Patient; Family Member    Disease / Diagnosis: GEORGES    Barriers / Limitations:  None   Comments:    Method:  Brief focused and Reinforcement   Comments:    General Topics:  Plan of care reviewed and Fall risk and prevention   Comments:    Outcome:  Shows understanding   Comments:  Venofer infused without any complications. Observed for half hour after infusion. Vital signs taken at the end of the 30-minute observation. Patient denied any complaints/issues. Discharged in good condition. Advised to call for any questions/concerns/issues.

## (undated) DEVICE — 1200CC GUARDIAN II: Brand: GUARDIAN

## (undated) DEVICE — ENDOSCOPY PACK - LOWER: Brand: MEDLINE INDUSTRIES, INC.

## (undated) DEVICE — 3M™ RED DOT™ MONITORING ELECTRODE WITH FOAM TAPE AND STICKY GEL, 50/BAG, 20/CASE, 72/PLT 2570: Brand: RED DOT™

## (undated) DEVICE — ENDOSCOPY PACK UPPER: Brand: MEDLINE INDUSTRIES, INC.

## (undated) DEVICE — FILTERLINE NASAL ADULT O2/CO2

## (undated) DEVICE — Device: Brand: DEFENDO AIR/WATER/SUCTION AND BIOPSY VALVE

## (undated) DEVICE — FLUIDGARD® 160 ANTI-FOG SURGICAL MASK WITH ANTI-GLARE SHIELD: Brand: PRECEPT ®

## (undated) DEVICE — FORCEP BIOPSY RJ4 LG CAP W/ND